# Patient Record
Sex: MALE | Race: WHITE | NOT HISPANIC OR LATINO | URBAN - NONMETROPOLITAN AREA
[De-identification: names, ages, dates, MRNs, and addresses within clinical notes are randomized per-mention and may not be internally consistent; named-entity substitution may affect disease eponyms.]

---

## 2017-07-28 NOTE — PATIENT DISCUSSION
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

## 2017-07-28 NOTE — PATIENT DISCUSSION
(H26.861) Other secondary cataract, right eye - Assesment : Moderate posterior capsule opacification present. - Plan : Monitor for Changes. Advised patient to call our office with decreased vision or increased symptoms.  RV 1 year EXAM

## 2017-07-28 NOTE — PATIENT DISCUSSION
(H35.363) Drusen (degenerative) of macula, bilateral - Assesment : Examination revealed Drusen, centrally - Plan : Continue to monitor for changes

## 2018-05-09 PROBLEM — Z96.1: Noted: 2018-05-09

## 2018-05-09 PROBLEM — H16.223: Noted: 2018-05-09

## 2019-03-04 NOTE — PATIENT DISCUSSION
(H26.171) Other secondary cataract, right eye - Assesment : Posterior capsule opacification present. Patient is currently asymptomatic and functioning well. - Plan : Monitor for Changes. Advised patient to call our office with decreased vision or increased symptoms.

## 2019-03-04 NOTE — PATIENT DISCUSSION
(Z94.7) Corneal transplant status - Assesment : Hx of corneal transplant OU. - Plan : Monitor for changes.

## 2019-03-04 NOTE — PATIENT DISCUSSION
(H35.305) Drusen (degenerative) of macula, bilateral - Assesment : Examination revealed scattered Drusen OU. - Plan : Monitor for changes. Advised patient to call our office with decreased vision or increased symptoms. RV 1 year Exam/MAC OCT.

## 2019-03-04 NOTE — PATIENT DISCUSSION
(M02.696) Dermatochalasis of right upper eyelid - Assesment : Examination revealed Dermatochalasis - Plan : Monitor for changes. Advised patient to call our office with decreased vision or increased symptoms.

## 2019-03-04 NOTE — PATIENT DISCUSSION
(M67.208) Dermatochalasis of left upper eyelid - Assesment : Examination revealed Dermatochalasis - Plan : Monitor for changes. Advised patient to call our office with decreased vision or increased symptoms.

## 2019-05-16 ENCOUNTER — IMPORTED ENCOUNTER (OUTPATIENT)
Dept: URBAN - NONMETROPOLITAN AREA CLINIC 1 | Facility: CLINIC | Age: 74
End: 2019-05-16

## 2019-05-16 PROCEDURE — 99213 OFFICE O/P EST LOW 20 MIN: CPT

## 2019-05-16 NOTE — PATIENT DISCUSSION
s/p PC IOL OU-  discussed findings w/patient-  Stable PCIOL OU. -  Monitor for PCO.-  RTC 1 year or prnDES OU-  Explained JACIEL and associated symptoms.-  Recommend increasing Omega 3s. -  Pt to begin artificial tears OU KHALIF.-  Start refresh liquigel QHS OU-  RTC 4-6 weeks or prn

## 2019-06-26 ENCOUNTER — IMPORTED ENCOUNTER (OUTPATIENT)
Dept: URBAN - NONMETROPOLITAN AREA CLINIC 1 | Facility: CLINIC | Age: 74
End: 2019-06-26

## 2019-06-26 PROBLEM — H02.135: Noted: 2021-01-06

## 2019-06-26 PROBLEM — Z96.1: Noted: 2019-06-26

## 2019-06-26 PROBLEM — H16.223: Noted: 2019-06-26

## 2019-06-26 PROBLEM — H02.132: Noted: 2021-01-06

## 2019-06-26 PROBLEM — Z96.1: Noted: 2021-01-06

## 2019-06-26 PROCEDURE — 99213 OFFICE O/P EST LOW 20 MIN: CPT

## 2019-06-26 NOTE — PATIENT DISCUSSION
JACIEL OU-  Explained JACIEL and associated symptoms. -  condition is much improved today-  Recommend increasing Omega 3s. -  Continue refresh liquigel QHS OU-  Monitor May 2020 Complete or prn s/p PC IOL OU-  discussed findings w/patient-  Stable PCIOL OU. -  Monitor for PCO.-  RTC 1 year or prn

## 2020-05-13 ENCOUNTER — IMPORTED ENCOUNTER (OUTPATIENT)
Dept: URBAN - NONMETROPOLITAN AREA CLINIC 1 | Facility: CLINIC | Age: 75
End: 2020-05-13

## 2020-05-13 PROCEDURE — 92014 COMPRE OPH EXAM EST PT 1/>: CPT

## 2020-05-13 NOTE — PATIENT DISCUSSION
JACIEL OU-  Explained JACIEL and associated symptoms. -  condition worsening at this time-  Continue Omega 3s daily-  Continue refresh liquigel QHS OU-  Start Restasis BID OU-  RTC 3 mo f/u JACIEL or prns/p PC IOL OU-  discussed findings w/patient & wife-  findings are stable at this-  RTC 1 year or prn; 's Notes: [facility] SERVICES [facility] 5/13/2020

## 2020-08-19 ENCOUNTER — IMPORTED ENCOUNTER (OUTPATIENT)
Dept: URBAN - NONMETROPOLITAN AREA CLINIC 1 | Facility: CLINIC | Age: 75
End: 2020-08-19

## 2020-08-19 PROCEDURE — 99213 OFFICE O/P EST LOW 20 MIN: CPT

## 2020-08-19 NOTE — PATIENT DISCUSSION
JACIEL OU - slight improvement-  Explained JACIEL and associated symptoms.-  Continue Omega 3s daily-  Continue refresh liquigel QHS OU-  Continue Restasis BID OU-  RTC 3 mo f/u JACIEL or prns/p PC IOL OU-  discussed findings w/patient & wife-  findings are stable at this-  RTC 1 year or prn; 's Notes: [facility] SERVICES [facility] 5/13/2020

## 2020-09-23 NOTE — PATIENT DISCUSSION
Patient advised to check [employer] regularly (once weekly or more) and use nutraceuticals such as AREDS 2 eye vitamins. Wear sunglasses when outdoors and eat green, leafy vegetables to maintain ocular health.

## 2021-01-06 ENCOUNTER — IMPORTED ENCOUNTER (OUTPATIENT)
Dept: URBAN - NONMETROPOLITAN AREA CLINIC 1 | Facility: CLINIC | Age: 76
End: 2021-01-06

## 2021-01-06 PROCEDURE — 99213 OFFICE O/P EST LOW 20 MIN: CPT

## 2021-01-06 NOTE — PATIENT DISCUSSION
JACIEL OU/Ectropion OU-  discussed findings w/patient and wife-  worsening signs today inferior cornea-  discussed ectropion -  continue Restasis BID OU-  continue AT's at least BID OU-  d/c gel-  start Genteal gel ointment QHS OU-  RTC 3 mo Complete or prns/p PC IOL OU-  discussed findings w/patient & wife-  findings are stable at this-  RTC 3 mo Complete or prn; 's Notes: [facility] SERVICES [facility] 5/13/2020

## 2021-04-07 ENCOUNTER — IMPORTED ENCOUNTER (OUTPATIENT)
Dept: URBAN - NONMETROPOLITAN AREA CLINIC 1 | Facility: CLINIC | Age: 76
End: 2021-04-07

## 2021-04-07 PROCEDURE — 99214 OFFICE O/P EST MOD 30 MIN: CPT

## 2021-04-07 NOTE — PATIENT DISCUSSION
JACIEL OU/Ectropion OU-  discussed findings w/patient and wife-  discussed ectropion -  continue Restasis BID OU-  continue AT's at least BID OU-  start Genteal gel ointment QHS OU-  dsicussed referral for ectropion eval patient elects to proceed w/ referral-  RTC Refer to [address] [place] [address] for Ectropion EvalPseudophakia OU-  discussed findings w/patient & wife-  findings are stable at this-  continue to monitor; 's Notes: MR deferred 4/7/2021DFE 4/7/2021

## 2021-06-22 ENCOUNTER — IMPORTED ENCOUNTER (OUTPATIENT)
Dept: URBAN - NONMETROPOLITAN AREA CLINIC 1 | Facility: CLINIC | Age: 76
End: 2021-06-22

## 2021-06-22 ENCOUNTER — PREPPED CHART (OUTPATIENT)
Dept: RURAL CLINIC 1 | Facility: CLINIC | Age: 76
End: 2021-06-22

## 2021-06-22 PROCEDURE — 92012 INTRM OPH EXAM EST PATIENT: CPT

## 2021-06-23 PROBLEM — H02.135: Noted: 2021-06-23

## 2021-06-23 PROBLEM — H02.132: Noted: 2021-06-23

## 2021-06-23 PROBLEM — Z96.1: Noted: 2021-01-06

## 2021-06-23 PROBLEM — H16.223: Noted: 2021-06-23

## 2022-03-25 ASSESSMENT — VISUAL ACUITY
OS_CC: 20/30
OD_CC: 20/30

## 2022-03-25 ASSESSMENT — TONOMETRY
OS_IOP_MMHG: 14
OD_IOP_MMHG: 12

## 2022-04-10 ASSESSMENT — VISUAL ACUITY
OU_SC: 20/30-1
OS_SC: 20/25
OS_SC: 20/25-2
OU_CC: 20/20
OD_SC: 20/25
OU_SC: 20/20-
OS_SC: 20/20
OD_SC: 20/20-2
OD_SC: 20/25-1
OU_SC: 20/25
OD_SC: 20/30+
OS_SC: 20/40
OS_SC: 20/25
OS_SC: 20/20-2
OS_SC: 20/30+2
OD_SC: 20/20-2
OD_SC: 20/25
OU_CC: J2
OD_SC: 20/50-1

## 2022-04-10 ASSESSMENT — TONOMETRY
OS_IOP_MMHG: 14
OD_IOP_MMHG: 13
OD_IOP_MMHG: 12
OS_IOP_MMHG: 14
OD_IOP_MMHG: 14
OS_IOP_MMHG: 14
OD_IOP_MMHG: 14
OD_IOP_MMHG: 13
OD_IOP_MMHG: 14

## 2022-12-09 ENCOUNTER — CLINIC PROCEDURE ONLY (OUTPATIENT)
Dept: RURAL CLINIC 1 | Facility: CLINIC | Age: 77
End: 2022-12-09

## 2022-12-09 PROCEDURE — 67914 REPAIR EYELID DEFECT: CPT

## 2022-12-09 NOTE — PATIENT DISCUSSION
Bilateral Ectropion LL -Ectropion (the lower eyelid rolling outward causing lashes to rub against the eye) was explained to the patient. .-This can result in excessive tearing irritation infection scarring and loss of vision.-Treatment options include observation or surgical correction.-Risks and benefits of ectropion repair were discussed and pt elects to proceed. Will schedule at patient's convenience.-Will have Carlotta call patient to schedule task sent. Will repair both ectopion same day -Patient advised to stop his ASA x 3 weeks prior to sx. -Call home [phone] or cell [phone] to schedule and can leave message per patients wife if no answer. -No mention of valium rx needed was discussed today.; 's Notes: MR deferred 4/7/2021DFE 4/7/2021.

## 2022-12-21 ENCOUNTER — POST-OP (OUTPATIENT)
Dept: RURAL CLINIC 1 | Facility: CLINIC | Age: 77
End: 2022-12-21

## 2022-12-21 PROCEDURE — 99024 POSTOP FOLLOW-UP VISIT: CPT

## 2022-12-21 NOTE — PROCEDURE NOTE: CLINICAL
PROCEDURE NOTE: Suture Removal Anesthesia: Topical. Prep: Antibiotic Drops. Prior to treatment, the risks/benefits/alternatives were discussed. The patient wished to proceed with procedure. A time out to confirm the patient, site, and procedure has been achieved. The site has been marked. The suture(s) were removed at the slit lamp with a forcep instrument along with a 30 gauge. Patient tolerated the procedure well. There were no complications. Post procedure instructions given. oJaquín Lyles

## 2023-08-04 ENCOUNTER — ESTABLISHED PATIENT (OUTPATIENT)
Dept: URBAN - NONMETROPOLITAN AREA CLINIC 4 | Facility: CLINIC | Age: 78
End: 2023-08-04

## 2023-08-04 DIAGNOSIS — H52.4: ICD-10-CM

## 2023-08-04 DIAGNOSIS — H16.223: ICD-10-CM

## 2023-08-04 DIAGNOSIS — Z98.890: ICD-10-CM

## 2023-08-04 DIAGNOSIS — H43.813: ICD-10-CM

## 2023-08-04 DIAGNOSIS — H52.01: ICD-10-CM

## 2023-08-04 DIAGNOSIS — Z96.1: ICD-10-CM

## 2023-08-04 PROCEDURE — 92015 DETERMINE REFRACTIVE STATE: CPT

## 2023-08-04 PROCEDURE — 99214 OFFICE O/P EST MOD 30 MIN: CPT

## 2023-08-04 PROCEDURE — 92134 CPTRZ OPH DX IMG PST SGM RTA: CPT

## 2023-08-04 ASSESSMENT — TONOMETRY
OS_IOP_MMHG: 15
OD_IOP_MMHG: 14

## 2023-08-04 ASSESSMENT — VISUAL ACUITY
OS_PH: 20/40
OS_SC: 20/70
OD_SC: 20/100+2
OD_PH: 20/50

## 2023-11-27 ENCOUNTER — FOLLOW UP (OUTPATIENT)
Dept: RURAL CLINIC 1 | Facility: CLINIC | Age: 78
End: 2023-11-27

## 2023-11-27 DIAGNOSIS — Z96.1: ICD-10-CM

## 2023-11-27 DIAGNOSIS — Z98.890: ICD-10-CM

## 2023-11-27 DIAGNOSIS — H43.813: ICD-10-CM

## 2023-11-27 DIAGNOSIS — H16.223: ICD-10-CM

## 2023-11-27 PROCEDURE — 92014 COMPRE OPH EXAM EST PT 1/>: CPT

## 2023-11-27 ASSESSMENT — VISUAL ACUITY
OS_SC: 20/50
OU_SC: 20/50
OD_SC: 20/50